# Patient Record
Sex: MALE | Race: WHITE | NOT HISPANIC OR LATINO | Employment: STUDENT | URBAN - METROPOLITAN AREA
[De-identification: names, ages, dates, MRNs, and addresses within clinical notes are randomized per-mention and may not be internally consistent; named-entity substitution may affect disease eponyms.]

---
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
## 2023-12-09 ENCOUNTER — OFFICE VISIT (OUTPATIENT)
Dept: URGENT CARE | Facility: URGENT CARE | Age: 23
End: 2023-12-09
Payer: OTHER GOVERNMENT

## 2023-12-09 VITALS
DIASTOLIC BLOOD PRESSURE: 80 MMHG | RESPIRATION RATE: 16 BRPM | WEIGHT: 165 LBS | BODY MASS INDEX: 21.77 KG/M2 | OXYGEN SATURATION: 96 % | TEMPERATURE: 98.8 F | SYSTOLIC BLOOD PRESSURE: 128 MMHG | HEART RATE: 102 BPM

## 2023-12-09 DIAGNOSIS — S61.412A LACERATION OF LEFT PALM, INITIAL ENCOUNTER: Primary | ICD-10-CM

## 2023-12-09 PROCEDURE — 12001 RPR S/N/AX/GEN/TRNK 2.5CM/<: CPT | Performed by: PHYSICIAN ASSISTANT

## 2023-12-09 RX ORDER — LAMOTRIGINE 100 MG/1
100 TABLET ORAL DAILY
COMMUNITY

## 2024-01-04 NOTE — PROGRESS NOTES
SUBJECTIVE:     Chief Complaint   Patient presents with    Urgent Care    Laceration     Cut to left hand about 5pm while cutting bread. Last tdap in high school so probably 5 years ago.      Favian Sanchez is a 23 year old male who presents to the clinic with a laceration on the left hand sustained 2 hour(s) ago.  This is a non-work related injury.    Mechanism of injury: knife.    Associated symptoms: Denies numbness, weakness, or loss of function  Last tetanus booster within 10 years: yes    EXAM:   The patient appears today in alert,no apparent distress distress  VITALS: /80   Pulse 102   Temp 98.8  F (37.1  C) (Temporal)   Resp 16   Wt 74.8 kg (165 lb)   SpO2 96%   BMI 21.77 kg/m      Size of laceration: 5 milliimeters  Characteristics of the laceration: bleeding- mild  Tendon function intact: yes  Sensation to light touch intact: yes  Pulses intact: not applicable  Picture included in patient's chart: no    Assessment:  (F76.555F) Laceration of left palm, initial encounter  (primary encounter diagnosis)  Plan: REPAIR SUPERFICIAL, WOUND BODY < =2.5CM            PLAN:  PROCEDURE NOTE::  Wound was locally injected with 1 cc's of Lidocaine 2% with epinephrine  Wound cleaned with HIBICLENS  Wound cleaned with betadine/saline solution  Deeper layers were approximated with 2  sutures interrupted sutures  After care instructions:  Keep wound clean and dry for the next 24-48 hours  Signs of infection discussed today  May return to work as long as wound is kept clean and dry  Discussed the probability of scarring  Active range of motion exercises encouraged

## 2024-05-15 ENCOUNTER — OFFICE VISIT (OUTPATIENT)
Dept: URGENT CARE | Facility: URGENT CARE | Age: 24
End: 2024-05-15
Payer: OTHER GOVERNMENT

## 2024-05-15 VITALS
HEART RATE: 86 BPM | WEIGHT: 164 LBS | SYSTOLIC BLOOD PRESSURE: 130 MMHG | OXYGEN SATURATION: 98 % | DIASTOLIC BLOOD PRESSURE: 78 MMHG | RESPIRATION RATE: 18 BRPM | TEMPERATURE: 97.9 F | BODY MASS INDEX: 21.64 KG/M2

## 2024-05-15 DIAGNOSIS — R05.1 ACUTE COUGH: Primary | ICD-10-CM

## 2024-05-15 DIAGNOSIS — J40 BRONCHITIS: ICD-10-CM

## 2024-05-15 LAB
BASOPHILS # BLD AUTO: 0 10E3/UL (ref 0–0.2)
BASOPHILS NFR BLD AUTO: 1 %
EOSINOPHIL # BLD AUTO: 0.1 10E3/UL (ref 0–0.7)
EOSINOPHIL NFR BLD AUTO: 2 %
ERYTHROCYTE [DISTWIDTH] IN BLOOD BY AUTOMATED COUNT: 13.2 % (ref 10–15)
HCT VFR BLD AUTO: 42 % (ref 40–53)
HGB BLD-MCNC: 14 G/DL (ref 13.3–17.7)
IMM GRANULOCYTES # BLD: 0 10E3/UL
IMM GRANULOCYTES NFR BLD: 0 %
LYMPHOCYTES # BLD AUTO: 1.6 10E3/UL (ref 0.8–5.3)
LYMPHOCYTES NFR BLD AUTO: 22 %
MCH RBC QN AUTO: 30.2 PG (ref 26.5–33)
MCHC RBC AUTO-ENTMCNC: 33.3 G/DL (ref 31.5–36.5)
MCV RBC AUTO: 91 FL (ref 78–100)
MONOCYTES # BLD AUTO: 0.6 10E3/UL (ref 0–1.3)
MONOCYTES NFR BLD AUTO: 8 %
NEUTROPHILS # BLD AUTO: 4.9 10E3/UL (ref 1.6–8.3)
NEUTROPHILS NFR BLD AUTO: 68 %
PLATELET # BLD AUTO: 264 10E3/UL (ref 150–450)
RBC # BLD AUTO: 4.64 10E6/UL (ref 4.4–5.9)
WBC # BLD AUTO: 7.2 10E3/UL (ref 4–11)

## 2024-05-15 PROCEDURE — 85025 COMPLETE CBC W/AUTO DIFF WBC: CPT | Performed by: NURSE PRACTITIONER

## 2024-05-15 PROCEDURE — 36415 COLL VENOUS BLD VENIPUNCTURE: CPT | Performed by: NURSE PRACTITIONER

## 2024-05-15 PROCEDURE — 99213 OFFICE O/P EST LOW 20 MIN: CPT | Performed by: NURSE PRACTITIONER

## 2024-05-15 RX ORDER — AZITHROMYCIN 250 MG/1
TABLET, FILM COATED ORAL
Qty: 6 TABLET | Refills: 0 | Status: SHIPPED | OUTPATIENT
Start: 2024-05-15 | End: 2024-05-20

## 2024-05-16 NOTE — PATIENT INSTRUCTIONS
CBC normal, viral URI running its course  Agreed to watchful waiting script as patient is going out of state  Only start zpak if worsening symptoms past day 10 (Monday)  Rest! Your body needs more rest to heal.  Drink plenty of fluids (warm fluids like tea or soup are soothing and reduce cough)  Sit in the bathroom with a hot shower running and breathe in the steam.  Honey may soothe your sore throat and help manage your cough- may take straight or in warm water with lemon juice.  Avoid smoke (cigarettes, bonfires, fireplace, wood burning stoves).  Take Tylenol or an NSAID such as ibuprofen or naproxen as needed for pain.  Delsym (dextromethorphan polistirex) is an over the counter cough medication that lasts 12 hours.   Mucinex or Robitussin (guiafenesin) thin mucus and may help it to loosen more quickly  Good handwashing is the best way to prevent spread of germs  Present to emergency room if you develop trouble breathing, swallowing or cough-up blood.  Follow up with your primary care provider if symptoms worsen or fail to improve as expected.

## 2024-05-16 NOTE — PROGRESS NOTES
Chief Complaint   Patient presents with    Urgent Care     Difficulty Breathing, Pt thinks he has Bronchitis and pneumonia, tight chest, cough X Sunday 05/12/2024     SUBJECTIVE:  Favian Sanchez is a 24 year old male presenting with concerns of bronchitis or pneumonia.  He has had a deep chest cough tightness in his lungs runny nose headache myalgias chills and a sore throat for 5 days.  The sore throat has subsided and now he is mostly just concerned about the cough.  Declines asthma or smoking.  Not concerned about COVID flu.  He is going out of state in a couple days and requesting an antibiotic in case things worsen.    No past medical history on file.  Current Outpatient Medications   Medication Sig Dispense Refill    azithromycin (ZITHROMAX) 250 MG tablet Take 2 tablets (500 mg) by mouth daily for 1 day, THEN 1 tablet (250 mg) daily for 4 days. 6 tablet 0    lamoTRIgine (LAMICTAL) 100 MG tablet Take 100 mg by mouth daily      TRAZODONE HCL PO        No current facility-administered medications for this visit.     Social History     Tobacco Use    Smoking status: Former     Types: Cigarettes    Smokeless tobacco: Current    Tobacco comments:     Haven't have a Cigarettes in 7  to 8 months    Substance Use Topics    Alcohol use: Not on file     Allergies   Allergen Reactions    Pcn [Penicillins]        Review of Systems  All systems negative except for those listed above in HPI.    OBJECTIVE:   BP (!) 148/79   Pulse 86   Temp 97.9  F (36.6  C) (Temporal)   Resp 18   Wt 74.4 kg (164 lb)   SpO2 98%   BMI 21.64 kg/m      Physical Exam  Vitals reviewed.   Constitutional:       Appearance: Normal appearance.   HENT:      Head: Normocephalic and atraumatic.      Right Ear: Tympanic membrane and ear canal normal.      Left Ear: Tympanic membrane and ear canal normal.      Nose: No congestion or rhinorrhea.      Mouth/Throat:      Mouth: Mucous membranes are moist.      Pharynx: Oropharynx is clear. No  oropharyngeal exudate or posterior oropharyngeal erythema.   Cardiovascular:      Rate and Rhythm: Normal rate.      Pulses: Normal pulses.   Pulmonary:      Effort: Pulmonary effort is normal. No respiratory distress.      Breath sounds: No stridor. No wheezing, rhonchi or rales.   Chest:      Chest wall: No tenderness.   Lymphadenopathy:      Cervical: Cervical adenopathy present.   Skin:     General: Skin is warm and dry.   Neurological:      General: No focal deficit present.      Mental Status: He is alert and oriented to person, place, and time.   Psychiatric:         Mood and Affect: Mood normal.         Behavior: Behavior normal.       Results for orders placed or performed in visit on 05/15/24   CBC with platelets and differential     Status: None   Result Value Ref Range    WBC Count 7.2 4.0 - 11.0 10e3/uL    RBC Count 4.64 4.40 - 5.90 10e6/uL    Hemoglobin 14.0 13.3 - 17.7 g/dL    Hematocrit 42.0 40.0 - 53.0 %    MCV 91 78 - 100 fL    MCH 30.2 26.5 - 33.0 pg    MCHC 33.3 31.5 - 36.5 g/dL    RDW 13.2 10.0 - 15.0 %    Platelet Count 264 150 - 450 10e3/uL    % Neutrophils 68 %    % Lymphocytes 22 %    % Monocytes 8 %    % Eosinophils 2 %    % Basophils 1 %    % Immature Granulocytes 0 %    Absolute Neutrophils 4.9 1.6 - 8.3 10e3/uL    Absolute Lymphocytes 1.6 0.8 - 5.3 10e3/uL    Absolute Monocytes 0.6 0.0 - 1.3 10e3/uL    Absolute Eosinophils 0.1 0.0 - 0.7 10e3/uL    Absolute Basophils 0.0 0.0 - 0.2 10e3/uL    Absolute Immature Granulocytes 0.0 <=0.4 10e3/uL   CBC with platelets and differential     Status: None    Narrative    The following orders were created for panel order CBC with platelets and differential.  Procedure                               Abnormality         Status                     ---------                               -----------         ------                     CBC with platelets and d...[[id number]]                      Final result                 Please view results for these tests  on the individual orders.     ASSESSMENT:    ICD-10-CM    1. Acute cough  R05.1 CBC with platelets and differential     CBC with platelets and differential      2. Bronchitis  J40 azithromycin (ZITHROMAX) 250 MG tablet        PLAN:     CBC normal, viral URI running its course  Agreed to watchful waiting script as patient is going out of state  Only start zpak if worsening symptoms past day 10 (Monday)  Rest! Your body needs more rest to heal.  Drink plenty of fluids (warm fluids like tea or soup are soothing and reduce cough)  Sit in the bathroom with a hot shower running and breathe in the steam.  Honey may soothe your sore throat and help manage your cough- may take straight or in warm water with lemon juice.  Avoid smoke (cigarettes, bonfires, fireplace, wood burning stoves).  Take Tylenol or an NSAID such as ibuprofen or naproxen as needed for pain.  Delsym (dextromethorphan polistirex) is an over the counter cough medication that lasts 12 hours.   Mucinex or Robitussin (guiafenesin) thin mucus and may help it to loosen more quickly  Good handwashing is the best way to prevent spread of germs  Present to emergency room if you develop trouble breathing, swallowing or cough-up blood.  Follow up with your primary care provider if symptoms worsen or fail to improve as expected.    Follow up with primary care provider with any problems, questions or concerns or if symptoms worsen or fail to improve. Patient agreed to plan and verbalized understanding.    Leonor Suazo, AYDEE-BC  [facility]

## 2024-06-15 ENCOUNTER — OFFICE VISIT (OUTPATIENT)
Dept: URGENT CARE | Facility: URGENT CARE | Age: 24
End: 2024-06-15
Payer: COMMERCIAL

## 2024-06-15 VITALS
WEIGHT: 164 LBS | RESPIRATION RATE: 21 BRPM | HEART RATE: 67 BPM | TEMPERATURE: 97.3 F | BODY MASS INDEX: 21.64 KG/M2 | OXYGEN SATURATION: 98 % | DIASTOLIC BLOOD PRESSURE: 62 MMHG | SYSTOLIC BLOOD PRESSURE: 118 MMHG

## 2024-06-15 DIAGNOSIS — G43.919 INTRACTABLE MIGRAINE WITHOUT STATUS MIGRAINOSUS, UNSPECIFIED MIGRAINE TYPE: Primary | ICD-10-CM

## 2024-06-15 PROCEDURE — 99213 OFFICE O/P EST LOW 20 MIN: CPT | Mod: 25 | Performed by: PHYSICIAN ASSISTANT

## 2024-06-15 PROCEDURE — 96372 THER/PROPH/DIAG INJ SC/IM: CPT | Performed by: PHYSICIAN ASSISTANT

## 2024-06-15 RX ORDER — SUMATRIPTAN 50 MG/1
50 TABLET, FILM COATED ORAL
Qty: 9 TABLET | Refills: 0 | Status: SHIPPED | OUTPATIENT
Start: 2024-06-15

## 2024-06-15 RX ORDER — DEXAMETHASONE SODIUM PHOSPHATE 10 MG/ML
4 INJECTION INTRAMUSCULAR; INTRAVENOUS ONCE
Status: DISCONTINUED | OUTPATIENT
Start: 2024-06-15 | End: 2024-06-15

## 2024-06-15 RX ORDER — KETOROLAC TROMETHAMINE 30 MG/ML
30 INJECTION, SOLUTION INTRAMUSCULAR; INTRAVENOUS ONCE
Status: COMPLETED | OUTPATIENT
Start: 2024-06-15 | End: 2024-06-15

## 2024-06-15 RX ORDER — DEXAMETHASONE SODIUM PHOSPHATE 10 MG/ML
4 INJECTION INTRAMUSCULAR; INTRAVENOUS ONCE
Status: COMPLETED | OUTPATIENT
Start: 2024-06-15 | End: 2024-06-15

## 2024-06-15 RX ADMIN — KETOROLAC TROMETHAMINE 30 MG: 30 INJECTION, SOLUTION INTRAMUSCULAR; INTRAVENOUS at 11:28

## 2024-06-15 RX ADMIN — DEXAMETHASONE SODIUM PHOSPHATE 4 MG: 10 INJECTION INTRAMUSCULAR; INTRAVENOUS at 11:28

## 2024-06-15 ASSESSMENT — ENCOUNTER SYMPTOMS
HEADACHES: 1
PHOTOPHOBIA: 1
NAUSEA: 0
VOMITING: 0

## 2024-06-15 ASSESSMENT — PAIN SCALES - GENERAL: PAINLEVEL: NO PAIN (0)

## 2024-06-15 NOTE — PROGRESS NOTES
Assessment & Plan      1. Intractable migraine without status migrainosus, unspecified migraine type    -Patient was given Toradol in the clinic and Decadron 4 mg as an adjuvant therapy.  Patient was reassessed after 20 minutes.  He did report improvement in the pain to 1/10 at rest.  He still notes worsening pain with movement, when he stands up and sits down.  - ketorolac (TORADOL) injection 30 mg  - dexAMETHasone (DECADRON) injectable solution used ORALLY 4 mg  - Adult Neurology  Referral; Future  - SUMAtriptan (IMITREX) 50 MG tablet; Take 1 tablet (50 mg) by mouth at onset of headache for migraine May repeat in 2 hours. Max 4 tablets/24 hours.  Dispense: 9 tablet; Refill: 0      Patient Instructions   Follow up with the Headache clinic for further evaluation     Return for Follow up with Neurology (headache clinic).    At the end of the encounter, I discussed results, diagnosis, medications. Discussed red flags for immediate return to clinic/ER, as well as indications for follow up if no improvement. Patient understood and agreed to plan. Patient was stable for discharge.    Mike Ballesteros is a 24 year old male who presents to clinic today  for the following health issues:  Chief Complaint   Patient presents with    Urgent Care     3 days   Back of the head  Side of the head  Behind eyes      HPI    Patient reports headache which started 3 days ago.  He reports constant throbbing headache on the back of the head, sides of the head and also behind the eyes.  He also reports the pain is sometimes sharp and intense whenever he changes position from sitting to standing.  He rates the pain at 4/10 at rest and 8/10 when worse.  The light, sound, movement bothers him.  He has no history of migraine headaches.  Sister has a history of migraine headaches.  He has tried aspirin 325 mg which helped today.  He also tried aspirin and drank lots of coffee 1 day ago which helped with symptoms for 1 hour.  He  denies visual changes, dizziness, lightheadedness, weakness, nausea or vomiting.        Review of Systems   Eyes:  Positive for photophobia.   Gastrointestinal:  Negative for nausea and vomiting.   Neurological:  Positive for headaches.   All other systems reviewed and are negative.      Problem List:  There are no relevant problems documented for this patient.      No past medical history on file.    Social History     Tobacco Use    Smoking status: Former     Types: Cigarettes    Smokeless tobacco: Current    Tobacco comments:     Haven't have a Cigarettes in 7  to 8 months    Substance Use Topics    Alcohol use: Not on file           Objective    /62 (BP Location: Right arm, Patient Position: Sitting, Cuff Size: Adult Regular)   Pulse 67   Temp 97.3  F (36.3  C) (Temporal)   Resp 21   Wt 74.4 kg (164 lb)   SpO2 98%   BMI 21.64 kg/m    Physical Exam  Vitals and nursing note reviewed.   Constitutional:       Appearance: Normal appearance.   HENT:      Head: Normocephalic.      Nose: Nose normal.      Mouth/Throat:      Mouth: Mucous membranes are moist.      Pharynx: Oropharynx is clear.   Eyes:      Extraocular Movements: Extraocular movements intact.      Conjunctiva/sclera: Conjunctivae normal.      Pupils: Pupils are equal, round, and reactive to light.   Cardiovascular:      Rate and Rhythm: Normal rate and regular rhythm.      Heart sounds: Normal heart sounds.   Pulmonary:      Effort: Pulmonary effort is normal.      Breath sounds: Normal breath sounds.   Musculoskeletal:      Cervical back: Normal range of motion and neck supple.   Skin:     General: Skin is warm and dry.      Capillary Refill: Capillary refill takes less than 2 seconds.      Findings: No rash.   Neurological:      General: No focal deficit present.      Mental Status: He is alert and oriented to person, place, and time.      GCS: GCS eye subscore is 4. GCS verbal subscore is 5. GCS motor subscore is 6.      Sensory: Sensation  is intact.      Motor: Motor function is intact.      Coordination: Coordination is intact.      Gait: Gait is intact.      Deep Tendon Reflexes: Reflexes are normal and symmetric.      Comments:      Psychiatric:         Mood and Affect: Mood normal.         Behavior: Behavior normal.              Aide Ayala PA-C

## 2024-08-16 RX ORDER — LAMOTRIGINE 200 MG/1
200 TABLET ORAL DAILY
COMMUNITY
Start: 2024-04-30

## 2024-08-16 RX ORDER — LAMOTRIGINE 25 MG/1
50 TABLET ORAL DAILY
COMMUNITY
Start: 2023-09-19